# Patient Record
Sex: FEMALE | Race: WHITE | NOT HISPANIC OR LATINO | Employment: FULL TIME | URBAN - METROPOLITAN AREA
[De-identification: names, ages, dates, MRNs, and addresses within clinical notes are randomized per-mention and may not be internally consistent; named-entity substitution may affect disease eponyms.]

---

## 2024-05-18 ENCOUNTER — NURSE TRIAGE (OUTPATIENT)
Dept: NURSING | Facility: CLINIC | Age: 59
End: 2024-05-18

## 2024-05-18 ENCOUNTER — OFFICE VISIT (OUTPATIENT)
Dept: URGENT CARE | Facility: URGENT CARE | Age: 59
End: 2024-05-18
Payer: COMMERCIAL

## 2024-05-18 VITALS
TEMPERATURE: 97.8 F | OXYGEN SATURATION: 96 % | RESPIRATION RATE: 16 BRPM | HEIGHT: 66 IN | HEART RATE: 67 BPM | BODY MASS INDEX: 26.2 KG/M2 | SYSTOLIC BLOOD PRESSURE: 148 MMHG | WEIGHT: 163 LBS | DIASTOLIC BLOOD PRESSURE: 92 MMHG

## 2024-05-18 DIAGNOSIS — S61.210A LACERATION OF RIGHT INDEX FINGER WITHOUT FOREIGN BODY WITHOUT DAMAGE TO NAIL, INITIAL ENCOUNTER: Primary | ICD-10-CM

## 2024-05-18 PROCEDURE — 12002 RPR S/N/AX/GEN/TRNK2.6-7.5CM: CPT | Performed by: PHYSICIAN ASSISTANT

## 2024-05-18 RX ORDER — ATORVASTATIN CALCIUM 20 MG/1
1 TABLET, FILM COATED ORAL AT BEDTIME
COMMUNITY
Start: 2023-12-06

## 2024-05-18 ASSESSMENT — PAIN SCALES - GENERAL: PAINLEVEL: NO PAIN (0)

## 2024-05-18 NOTE — TELEPHONE ENCOUNTER
Patient reporting she cut her left pointer finger and has been bleeding for five minutes despite pressure.  The cut is gaping over an inch long.    Disposition is to ED or UC is an alternate disposition.  Patient verbalizes understanding of care advice and agrees with plan.  She will try to get to an UC first.    Hannah Arcos RN  [facility]    Reason for Disposition   Skin is split open or gaping (or length > 1/2 inch or 12 mm on the skin, 1/4 inch or 6 mm on the face)    Additional Information   Negative: [1] Major bleeding (e.g., actively dripping or spurting) AND [2] can't be stopped   Negative: Amputation   Negative: Shock suspected (e.g., cold/pale/clammy skin, too weak to stand, low BP, rapid pulse)   Negative: [1] Knife wound (or other possibly deep cut) AND [2] to chest, abdomen, back, neck, or head   Negative: [1] Self-injury (e.g., cutting, self-harm) AND [2] suicidal or out-of-control   Negative: Sounds like a life-threatening emergency to the triager   Negative: [1] Bleeding AND [2] won't stop after 10 minutes of direct pressure (using correct technique)    Protocols used: Cuts and [id number]

## 2024-05-19 NOTE — PROGRESS NOTES
"Patient presents with:  Urgent Care: Patient presenting to  today for a cut on left index finger received approximately 18:15 from sharp knife which chopping onions.  Patient wrapped it quickly and came here - has not unwrapped to view since.    (S61.210A) Laceration of right index finger without foreign body without damage to nail, initial encounter  (primary encounter diagnosis)  Comment:   Plan: REPAIR SUPERFICIAL, WOUND BODY 2.6-7.5 CM          3 sutures placed.  Suture removal in 10 days.    Keep covered 24 hours a day for the first 3 days.  Change dressing every 24 hours, clean with soapy water rinse pat dry, apply bacitracin and nonstick dressing.  After 3 days, he may cover only during the day when you are working with your hands.  Otherwise sleep open to air to dry out.    Return to urgent care for suture removal in 10 days.    SUBJECTIVE:     Chief Complaint   Patient presents with    Urgent Care     Patient presenting to  today for a cut on left index finger received approximately 18:15 from sharp knife which chopping onions.  Patient wrapped it quickly and came here - has not unwrapped to view since.     Noa Smith is a 59 year old female who presents to the clinic with a laceration on the right index finger sustained just prior to arrival in clinic, cut it with a knife while chopping onions.      Associated symptoms: Denies numbness, weakness, or loss of function  Last tetanus booster within 10 years: yes    EXAM:   The patient appears today in alert,no apparent distress distress  VITALS: BP (!) 148/92 (BP Location: Right arm)   Pulse 67   Temp 97.8  F (36.6  C) (Tympanic)   Resp 16   Ht 1.676 m (5' 6\")   Wt 73.9 kg (163 lb)   SpO2 96%   BMI 26.31 kg/m      Size of laceration: 2.6 centimeters  Characteristics of the laceration: active bleeding and extends into subcutaneous fat  Tendon function intact: yes  Sensation to light touch intact: yes  Pulses intact: yes  Picture included in " patient's chart: no    Assessment:  Laceration of right index finger without foreign body without damage to nail, initial encounter    PLAN:  PROCEDURE NOTE::  Wound was locally injected with 2 cc's of Lidocaine 1% plain  Prepped and draped in the usual sterile fashion  Wound cleaned with betadine/saline solution  Wound cleaned with Shur-Clens  Wound soaked  Wound irrigated  Laceration was closed using 3 4-0 nylon interrupted sutures  After care instructions:  Keep wound clean and dry for the next 24-48 hours  Sutures out in 10 days  Apply anti-bacterial ointment for 10 days  Discussed the probability of scarring

## 2024-05-19 NOTE — PATIENT INSTRUCTIONS
(W96.603A) Laceration of right index finger without foreign body without damage to nail, initial encounter  (primary encounter diagnosis)  Comment:   Plan: REPAIR SUPERFICIAL, WOUND BODY 2.6-7.5 CM            3 sutures placed.  Suture removal in 10 days.    Keep covered 24 hours a day for the first 3 days.  Change dressing every 24 hours, clean with soapy water rinse pat dry, apply bacitracin and nonstick dressing.  After 3 days, he may cover only during the day when you are working with your hands.  Otherwise sleep open to air to dry out.    Return to urgent care for suture removal in 10 days.

## 2024-05-28 ENCOUNTER — OFFICE VISIT (OUTPATIENT)
Dept: URGENT CARE | Facility: URGENT CARE | Age: 59
End: 2024-05-28
Payer: COMMERCIAL

## 2024-05-28 VITALS
SYSTOLIC BLOOD PRESSURE: 154 MMHG | RESPIRATION RATE: 14 BRPM | OXYGEN SATURATION: 99 % | HEART RATE: 62 BPM | DIASTOLIC BLOOD PRESSURE: 89 MMHG | TEMPERATURE: 98.7 F

## 2024-05-28 DIAGNOSIS — Z48.02 VISIT FOR SUTURE REMOVAL: Primary | ICD-10-CM

## 2024-05-28 PROCEDURE — 99207 PR NO CHARGE LOS: CPT

## 2024-05-28 NOTE — PROGRESS NOTES
Noa BURR Luis presents to the clinic for removal of sutures and sutures,staples, steri strips. The patient has had sutures in place for 10 days. There has been no patient reported signs or symptoms of infection or drainage. 10  sutures and sutures,staples, staple, steri strips are seen and located on the left hand index finger. Tetanus status is up to date. All sutures  were easily removed today. Routine wound care discussed by the RN or provider. The patient will follow up as needed.

## 2024-10-05 ENCOUNTER — OFFICE VISIT (OUTPATIENT)
Dept: URGENT CARE | Facility: URGENT CARE | Age: 59
End: 2024-10-05
Payer: COMMERCIAL

## 2024-10-05 VITALS
HEART RATE: 71 BPM | TEMPERATURE: 97.6 F | SYSTOLIC BLOOD PRESSURE: 134 MMHG | DIASTOLIC BLOOD PRESSURE: 77 MMHG | RESPIRATION RATE: 16 BRPM | OXYGEN SATURATION: 97 %

## 2024-10-05 DIAGNOSIS — S61.209A AVULSION OF SKIN OF FINGER, INITIAL ENCOUNTER: Primary | ICD-10-CM

## 2024-10-05 PROCEDURE — 99203 OFFICE O/P NEW LOW 30 MIN: CPT | Performed by: FAMILY MEDICINE
